# Patient Record
Sex: MALE | HISPANIC OR LATINO | Employment: STUDENT | RURAL
[De-identification: names, ages, dates, MRNs, and addresses within clinical notes are randomized per-mention and may not be internally consistent; named-entity substitution may affect disease eponyms.]

---
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
## 2024-05-29 NOTE — TELEPHONE ENCOUNTER
1st attempt LMTCB to [facility] appt. For ED    Family member tested positive for type A flu.  Would like preventative Tamiflu sent in if okay.  Okay per Dr. Hollins to send in Tamiflu 75mg daily [id number].